# Patient Record
Sex: FEMALE | Race: WHITE | NOT HISPANIC OR LATINO | Employment: STUDENT | URBAN - METROPOLITAN AREA
[De-identification: names, ages, dates, MRNs, and addresses within clinical notes are randomized per-mention and may not be internally consistent; named-entity substitution may affect disease eponyms.]

---

## 2023-01-26 ENCOUNTER — HOSPITAL ENCOUNTER (EMERGENCY)
Facility: HOSPITAL | Age: 18
Discharge: HOME | End: 2023-01-26
Attending: EMERGENCY MEDICINE

## 2023-01-26 VITALS
WEIGHT: 148.81 LBS | BODY MASS INDEX: 24.79 KG/M2 | HEART RATE: 116 BPM | RESPIRATION RATE: 16 BRPM | SYSTOLIC BLOOD PRESSURE: 135 MMHG | HEIGHT: 65 IN | OXYGEN SATURATION: 97 % | DIASTOLIC BLOOD PRESSURE: 76 MMHG | TEMPERATURE: 98.7 F

## 2023-01-26 DIAGNOSIS — S06.0X0A CONCUSSION WITHOUT LOSS OF CONSCIOUSNESS, INITIAL ENCOUNTER: Primary | ICD-10-CM

## 2023-01-26 NOTE — ED PROVIDER NOTES
History  Chief Complaint   Patient presents with   • Head Injury     Tanner Kirkpatrick last night & hit head, no loc, dizzy/foggy feeling, denies n/v, sent by school because they didn't evaluate there  38-year-old female presenting today with a head injury that occurred last night after she was walking, carrying laundry when the right portion of her scalp hit a metal pole  She did not fall, did not lose consciousness  Has not had any nausea or vomiting  States that since that point she has had headache primarily along the right portion of her scalp  She has had a concussion before and this feels similar  Was sent in from her college nurse for evaluation as she cannot get in with a physician until tomorrow  Mild decreased concentration, mild noise and light sensitivity  Occasional dizziness  Denies nausea, vomiting, confusion, double vision, weakness, numbness, paresthesias, abnormal gait  None       Past Medical History:   Diagnosis Date   • Asthma        Past Surgical History:   Procedure Laterality Date   • APPENDECTOMY         History reviewed  No pertinent family history  I have reviewed and agree with the history as documented  E-Cigarette/Vaping   • E-Cigarette Use Never User      E-Cigarette/Vaping Substances     Social History     Tobacco Use   • Smoking status: Never     Passive exposure: Current   • Smokeless tobacco: Never   Vaping Use   • Vaping Use: Never used   Substance Use Topics   • Alcohol use: Yes     Comment: minimal   • Drug use: Yes     Types: Marijuana     Comment: seldom       Review of Systems   Constitutional: Negative  Negative for chills, fatigue and fever  HENT: Negative  Negative for congestion, postnasal drip, rhinorrhea and sore throat  Eyes: Negative  Respiratory: Negative  Negative for cough, shortness of breath and wheezing  Cardiovascular: Negative  Gastrointestinal: Negative  Negative for abdominal pain, diarrhea, nausea and vomiting     Endocrine: Negative  Genitourinary: Negative  Musculoskeletal: Negative  Skin: Negative  Neurological: Positive for dizziness and headaches  Negative for tremors, seizures, syncope, facial asymmetry, speech difficulty, weakness, light-headedness and numbness  Hematological: Negative  Psychiatric/Behavioral: Negative  All other systems reviewed and are negative  Physical Exam  Physical Exam  Vitals and nursing note reviewed  Constitutional:       General: She is not in acute distress  Appearance: Normal appearance  She is well-developed and normal weight  She is not diaphoretic  HENT:      Head: Normocephalic and atraumatic  Right Ear: External ear normal       Left Ear: External ear normal       Nose: Nose normal       Mouth/Throat:      Pharynx: No oropharyngeal exudate  Eyes:      General: No scleral icterus  Right eye: No discharge  Left eye: No discharge  Conjunctiva/sclera: Conjunctivae normal       Pupils: Pupils are equal, round, and reactive to light  Cardiovascular:      Rate and Rhythm: Normal rate and regular rhythm  Pulses: Normal pulses  Heart sounds: Normal heart sounds  No murmur heard  No friction rub  No gallop  Pulmonary:      Effort: Pulmonary effort is normal  No respiratory distress  Breath sounds: Normal breath sounds  No stridor  No wheezing, rhonchi or rales  Comments: SPO2 is 97% indicating adequate oxygenation  Chest:      Chest wall: No tenderness  Abdominal:      General: Bowel sounds are normal  There is no distension  Palpations: Abdomen is soft  There is no mass  Tenderness: There is no abdominal tenderness  There is no guarding or rebound  Hernia: No hernia is present  Musculoskeletal:      Cervical back: Normal range of motion and neck supple  Lymphadenopathy:      Cervical: No cervical adenopathy  Skin:     General: Skin is warm and dry        Capillary Refill: Capillary refill takes less than 2 seconds  Coloration: Skin is not pale  Findings: No erythema or rash  Neurological:      General: No focal deficit present  Mental Status: She is alert and oriented to person, place, and time  Mental status is at baseline  GCS: GCS eye subscore is 4  GCS verbal subscore is 5  GCS motor subscore is 6  Cranial Nerves: Cranial nerves 2-12 are intact  Sensory: Sensation is intact  Motor: Motor function is intact  Coordination: Coordination is intact  Gait: Gait is intact  Comments: Good balance, good point-to-point test          Vital Signs  ED Triage Vitals [01/26/23 1620]   Temperature Pulse Respirations Blood Pressure SpO2   98 7 °F (37 1 °C) (!) 116 16 135/76 97 %      Temp src Heart Rate Source Patient Position - Orthostatic VS BP Location FiO2 (%)   -- -- -- -- --      Pain Score       5           Vitals:    01/26/23 1620   BP: 135/76   Pulse: (!) 116         Visual Acuity      ED Medications  Medications - No data to display    Diagnostic Studies  Results Reviewed     None                 No orders to display              Procedures  Procedures         ED Course                                             Medical Decision Making  Patient appears very well no distress  Patient has had a concussion before in the past   I discussed with patient ERIC  And agrees no need for imaging at this point in time  I do suspect a concussion based off patient's symptoms although low mechanism of injury, prior concussion places her at risk for repeat concussions  Discussed with patient the importance to remain out of any physical contact/sports for the next 2 weeks  We will have patient follow-up with the concussion clinic and her PCP should symptoms persist or return for worsening  Patient verbalized understanding and agrees with the above assessment and plan      Concussion without loss of consciousness, initial encounter: acute illness or injury  Risk  OTC drugs           Disposition  Final diagnoses:   Concussion without loss of consciousness, initial encounter     Time reflects when diagnosis was documented in both MDM as applicable and the Disposition within this note     Time User Action Codes Description Comment    1/26/2023  4:52 PM Kolton Villalba Add [[id number]] Concussion without loss of consciousness, initial encounter       ED Disposition     ED Disposition   Discharge    Condition   Stable    Date/Time   Thu Jan 26, 2023  4:52 PM    Comment   Wandra Shark discharge to home/self care  Follow-up Information     Follow up With Specialties Details Why Contact Info Additional [address] [facility] Emergency Department Emergency Medicine Go to  If symptoms worsen, otherwise please follow up with your family doctor and concussion clinic [facility] [id number]  [address] [place] [address] Emergency Department, [address] [facility] , [place], [place], [address] an appointment as soon as possible for a visit  As needed, if symptoms persist  [address]  [phone]             Patient's Medications    No medications on file       No discharge procedures on file      PDMP Review     None          ED Provider  Electronically Signed by           Yarely Dumont PA-C  01/26/23 5639